# Patient Record
Sex: MALE | Race: WHITE | NOT HISPANIC OR LATINO | Employment: OTHER | URBAN - METROPOLITAN AREA
[De-identification: names, ages, dates, MRNs, and addresses within clinical notes are randomized per-mention and may not be internally consistent; named-entity substitution may affect disease eponyms.]

---

## 2018-06-07 ENCOUNTER — RECORDS - HEALTHEAST (OUTPATIENT)
Dept: LAB | Facility: HOSPITAL | Age: 68
End: 2018-06-07

## 2018-06-07 LAB
ANION GAP SERPL CALCULATED.3IONS-SCNC: 9 MMOL/L (ref 5–18)
CHLORIDE BLD-SCNC: 105 MMOL/L (ref 98–107)
CO2 SERPL-SCNC: 27 MMOL/L (ref 22–31)
LEVETIRACETAM (KEPPRA): 7.5 UG/ML (ref 6–46)
POTASSIUM BLD-SCNC: 4.7 MMOL/L (ref 3.5–5)
SODIUM SERPL-SCNC: 141 MMOL/L (ref 136–145)

## 2019-06-18 ENCOUNTER — RECORDS - HEALTHEAST (OUTPATIENT)
Dept: LAB | Facility: HOSPITAL | Age: 69
End: 2019-06-18

## 2019-06-18 LAB
ANION GAP SERPL CALCULATED.3IONS-SCNC: 10 MMOL/L (ref 5–18)
CHLORIDE BLD-SCNC: 105 MMOL/L (ref 98–107)
CO2 SERPL-SCNC: 25 MMOL/L (ref 22–31)
LEVETIRACETAM (KEPPRA): 7.6 UG/ML (ref 6–46)
POTASSIUM BLD-SCNC: 4 MMOL/L (ref 3.5–5)
SODIUM SERPL-SCNC: 140 MMOL/L (ref 136–145)

## 2020-09-30 DIAGNOSIS — G40.209 PARTIAL SYMPTOMATIC EPILEPSY WITH COMPLEX PARTIAL SEIZURES, NOT INTRACTABLE, WITHOUT STATUS EPILEPTICUS (H): Primary | ICD-10-CM

## 2020-09-30 RX ORDER — LEVETIRACETAM 500 MG/1
TABLET ORAL
Qty: 60 TABLET | Refills: 0 | Status: SHIPPED | OUTPATIENT
Start: 2020-09-30 | End: 2020-11-02

## 2020-11-02 DIAGNOSIS — G40.209 PARTIAL SYMPTOMATIC EPILEPSY WITH COMPLEX PARTIAL SEIZURES, NOT INTRACTABLE, WITHOUT STATUS EPILEPTICUS (H): ICD-10-CM

## 2020-11-02 NOTE — TELEPHONE ENCOUNTER
Refill request for Keppra   Letter mailed to patient to schedule follow up appt   Medication T'd for review and signature  Fany Hwang CMA on 11/2/2020 at 4:29 PM

## 2020-11-02 NOTE — LETTER
11/2/2020        RE: Vitor Vargas  [address] [address]            Dear Vitor,      We recently provided you with medication refills.  Many medications require routine follow-up with your doctor.    Your prescription(s) have been refilled for 30 days so you may have time for the above noted follow-up. Please call to schedule soon so we can assure you have an appointment before your next refills are needed. If you have already made a follow up appointment, please disregard this letter.           Sincerely,        Zechariah Mondragon MD  [facility]     (Formerly known as [facility])

## 2020-11-03 RX ORDER — LEVETIRACETAM 500 MG/1
500 TABLET ORAL 2 TIMES DAILY
Qty: 60 TABLET | Refills: 0 | Status: SHIPPED | OUTPATIENT
Start: 2020-11-03 | End: 2020-11-13

## 2020-11-12 SDOH — HEALTH STABILITY: MENTAL HEALTH: HOW MANY STANDARD DRINKS CONTAINING ALCOHOL DO YOU HAVE ON A TYPICAL DAY?: NOT ASKED

## 2020-11-12 SDOH — HEALTH STABILITY: MENTAL HEALTH: HOW OFTEN DO YOU HAVE 6 OR MORE DRINKS ON ONE OCCASION?: NOT ASKED

## 2020-11-12 SDOH — HEALTH STABILITY: MENTAL HEALTH: HOW OFTEN DO YOU HAVE A DRINK CONTAINING ALCOHOL?: NOT ASKED

## 2020-11-13 ENCOUNTER — VIRTUAL VISIT (OUTPATIENT)
Dept: NEUROLOGY | Facility: CLINIC | Age: 70
End: 2020-11-13
Payer: COMMERCIAL

## 2020-11-13 VITALS — HEIGHT: 68 IN | BODY MASS INDEX: 24.25 KG/M2 | WEIGHT: 160 LBS

## 2020-11-13 DIAGNOSIS — G40.209 PARTIAL SYMPTOMATIC EPILEPSY WITH COMPLEX PARTIAL SEIZURES, NOT INTRACTABLE, WITHOUT STATUS EPILEPTICUS (H): ICD-10-CM

## 2020-11-13 PROBLEM — E78.5 HYPERLIPIDEMIA: Status: ACTIVE | Noted: 2020-11-13

## 2020-11-13 PROCEDURE — 99214 OFFICE O/P EST MOD 30 MIN: CPT | Mod: 95 | Performed by: PSYCHIATRY & NEUROLOGY

## 2020-11-13 RX ORDER — LEVETIRACETAM 500 MG/1
500 TABLET ORAL 2 TIMES DAILY
Qty: 180 TABLET | Refills: 3 | Status: SHIPPED | OUTPATIENT
Start: 2020-11-13 | End: 2021-11-16

## 2020-11-13 ASSESSMENT — MIFFLIN-ST. JEOR: SCORE: 1460.26

## 2020-11-13 NOTE — LETTER
2020         RE: Vitor Vargas  [address]  [place] [address]        Dear Colleague,    Thank you for referring your patient, Vitor Vargas, to the [facility] [place]. Please see a copy of my visit note below.    NEUROLOGY FOLLOW UP VISIT  NOTE       [facility] [place]  [address] [place] [address]  Tel: [phone]  Fax: [phone]  [email or web address]     Vitor Vargas,  1950, MRN [id number]  PCP: No Ref-Primary, Physician, None  Date: 2020     ASSESSMENT & PLAN     Diagnosis code: Partial symptomatic epilepsy with complex partial seizures, not intractable, without status epilepticus (H)     Complex partial seizures  70-year-old male with complex partial seizures well-controlled on current dose of Keppra.  Overall he had 3 seizures when he was evaluated in the hospital.  Work-up in the past included MRI and EEG that were unremarkable.  I have refilled his prescription for Keppra.  I am checking Keppra level and electrolyte panel.  Regular follow-up will be in 1 year.    Thank you again for this referral, please feel free to contact me if you have any questions.    Zechariah Mondragon MD  [facility]  (Formerly, [facility]     HISTORY OF PRESENT ILLNESS     Patient is 70-year-old male with history of complex partial seizure, hyperlipidemia who returns for yearly follow-up.  Due to the pandemic this visit was done on the phone.  He denies any seizures since his last visit.  At baseline he is quite tangential.  Overall he had 3 seizures and in the past had MRI and EEG that was unremarkable.  He has been on Keppra and seems to be tolerating it well.  He denies any loss of consciousness, tonic-clonic activity, tongue biting or any bowel or bladder incontinence.     PROBLEM LIST   Patient Active Problem List   Diagnosis Code     Epilepsy with partial complex seizures  "(H) G40.209     Hyperlipidemia E78.5     Tobacco use disorder F17.200         PAST MEDICAL & SURGICAL HISTORY     Past Medical History:   Patient  has no past medical history on file.    Surgical History:  He  has no past surgical history on file.     SOCIAL HISTORY     Reviewed, and he  reports that he has been smoking cigarettes. He has been smoking about 0.50 packs per day. He has never used smokeless tobacco. He reports previous alcohol use.     FAMILY HISTORY     Reviewed, and family history includes Alzheimer Disease in his mother; Cerebrovascular Disease in his brother, father, and mother; Coronary Artery Disease in his father; Diabetes in his father; Myocardial Infarction in his brother and father.     ALLERGIES     No Known Allergies      REVIEW OF SYSTEMS     A 12 point review of system was performed and was negative except as outlined in the history of present illness.     HOME MEDICATIONS       Current Outpatient Medications:      levETIRAcetam (KEPPRA) 500 MG tablet, Take 1 tablet (500 mg) by mouth 2 times daily MUST SCHEDULE FOLLOW  UP APPT FOR REFILLS, Disp: 60 tablet, Rfl: 0      PHYSICAL EXAM     Vital signs  Ht 1.727 m (5' 8\")   Wt 72.6 kg (160 lb)   BMI 24.33 kg/m      Weight:   160 lbs 0 oz    Patient is alert and oriented speech normal with no dysarthria or aphasia.  Hearing is normal.  Rest of the exam was not possible on the phone visit     DIAGNOSTIC STUDIES     PERTINENT RADIOLOGY  Following imaging studies were reviewed:     MRI head 10/20/2015 showed age-related changes  EEG 10/8/2015 normal     PERTINENT LABS  Following labs were reviewed:  Result Name Current Result Reference Range   Levetiracetam (Keppra) Level (ug/mL)  7.6 6/18/2019 6.0 - 46.0   Sodium Level (mmol/L)  140 6/18/2019 136 - 145   Potassium Level (mmol/L)  4.0 6/18/2019 3.5 - 5.0   CO2 Level (mmol/L)  25 6/18/2019 22 - 31   Chloride Level (mmol/L)  105 6/18/2019 98 - 107   AGAP (mmol/L)  10 6/18/2019 5 - 18       " "  TELEPHONE VISIT CONSENT   This telephone visit was done in lieu of a face to face visit due to COVID 19 pandemic. The patient has been notified of following:    \"This telephone visit will be conducted via a call between you and your physician/provider. We have found that certain health care needs can be provided without the need for a physical exam. This service lets us provide the care you need with a short phone conversation. If a prescription is necessary we can send it directly to your pharmacy. If lab work is needed we can place an order for that and you can then stop by our lab to have the test done at a later time. If during the course of the call the physician/provider feels a telephone visit is not appropriate, you will not be charged for this service.\"    Patient has given verbal consent for Telephone visit? YES  Consent has been obtained for this service by 1 care team member: YES    Total Time: visit 30 minutes         Total time spent for face to face visit, reviewing labs/imaging studies, counseling and coordination of care was: 30 Minutes More than 50% of this time was spent on counseling and coordination of care.      This note was dictated using voice recognition software.  Any grammatical or context distortions are unintentional and inherent to the software.             Again, thank you for allowing me to participate in the care of your patient.        Sincerely,        Zechariah Mondragon MD    "

## 2020-11-13 NOTE — PROGRESS NOTES
NEUROLOGY FOLLOW UP VISIT  NOTE       [facility]UR[facility] [place]  [address] [place] [address]  Tel: [phone]  Fax: [phone]  [email or web address]     Vitor Vargas,  1950, MRN [id number]  PCP: No Ref-Primary, Physician, None  Date: 2020     ASSESSMENT & PLAN     Diagnosis code: Partial symptomatic epilepsy with complex partial seizures, not intractable, without status epilepticus (H)     Complex partial seizures  70-year-old male with complex partial seizures well-controlled on current dose of Keppra.  Overall he had 3 seizures when he was evaluated in the hospital.  Work-up in the past included MRI and EEG that were unremarkable.  I have refilled his prescription for Keppra.  I am checking Keppra level and electrolyte panel.  Regular follow-up will be in 1 year.    Thank you again for this referral, please feel free to contact me if you have any questions.    Zechariah Mondragon MD  [facility]  (Former[facility], [facility]     HISTORY OF PRESENT ILLNESS     Patient is 70-year-old male with history of complex partial seizure, hyperlipidemia who returns for yearly follow-up.  Due to the pandemic this visit was done on the phone.  He denies any seizures since his last visit.  At baseline he is quite tangential.  Overall he had 3 seizures and in the past had MRI and EEG that was unremarkable.  He has been on Keppra and seems to be tolerating it well.  He denies any loss of consciousness, tonic-clonic activity, tongue biting or any bowel or bladder incontinence.     PROBLEM LIST   Patient Active Problem List   Diagnosis Code     Epilepsy with partial complex seizures (H) G40.209     Hyperlipidemia E78.5     Tobacco use disorder F17.200         PAST MEDICAL & SURGICAL HISTORY     Past Medical History:   Patient  has no past medical history on file.    Surgical History:  He  has no past surgical history on file.  "    SOCIAL HISTORY     Reviewed, and he  reports that he has been smoking cigarettes. He has been smoking about 0.50 packs per day. He has never used smokeless tobacco. He reports previous alcohol use.     FAMILY HISTORY     Reviewed, and family history includes Alzheimer Disease in his mother; Cerebrovascular Disease in his brother, father, and mother; Coronary Artery Disease in his father; Diabetes in his father; Myocardial Infarction in his brother and father.     ALLERGIES     No Known Allergies      REVIEW OF SYSTEMS     A 12 point review of system was performed and was negative except as outlined in the history of present illness.     HOME MEDICATIONS       Current Outpatient Medications:      levETIRAcetam (KEPPRA) 500 MG tablet, Take 1 tablet (500 mg) by mouth 2 times daily MUST SCHEDULE FOLLOW  UP APPT FOR REFILLS, Disp: 60 tablet, Rfl: 0      PHYSICAL EXAM     Vital signs  Ht 1.727 m (5' 8\")   Wt 72.6 kg (160 lb)   BMI 24.33 kg/m      Weight:   160 lbs 0 oz    Patient is alert and oriented speech normal with no dysarthria or aphasia.  Hearing is normal.  Rest of the exam was not possible on the phone visit     DIAGNOSTIC STUDIES     PERTINENT RADIOLOGY  Following imaging studies were reviewed:     MRI head 10/20/2015 showed age-related changes  EEG 10/8/2015 normal     PERTINENT LABS  Following labs were reviewed:  Result Name Current Result Reference Range   Levetiracetam (Keppra) Level (ug/mL)  7.6 6/18/2019 6.0 - 46.0   Sodium Level (mmol/L)  140 6/18/2019 136 - 145   Potassium Level (mmol/L)  4.0 6/18/2019 3.5 - 5.0   CO2 Level (mmol/L)  25 6/18/2019 22 - 31   Chloride Level (mmol/L)  105 6/18/2019 98 - 107   AGAP (mmol/L)  10 6/18/2019 5 - 18         TELEPHONE VISIT CONSENT   This telephone visit was done in lieu of a face to face visit due to COVID 19 pandemic. The patient has been notified of following:    \"This telephone visit will be conducted via a call between you and your physician/provider. " "We have found that certain health care needs can be provided without the need for a physical exam. This service lets us provide the care you need with a short phone conversation. If a prescription is necessary we can send it directly to your pharmacy. If lab work is needed we can place an order for that and you can then stop by our lab to have the test done at a later time. If during the course of the call the physician/provider feels a telephone visit is not appropriate, you will not be charged for this service.\"    Patient has given verbal consent for Telephone visit? YES  Consent has been obtained for this service by 1 care team member: YES    Total Time: visit 30 minutes         Total time spent for face to face visit, reviewing labs/imaging studies, counseling and coordination of care was: 30 Minutes More than 50% of this time was spent on counseling and coordination of care.      This note was dictated using voice recognition software.  Any grammatical or context distortions are unintentional and inherent to the software.         "

## 2020-11-25 ENCOUNTER — RECORDS - HEALTHEAST (OUTPATIENT)
Dept: LAB | Facility: HOSPITAL | Age: 70
End: 2020-11-25

## 2020-11-25 LAB
ANION GAP SERPL CALCULATED.3IONS-SCNC: 7 MMOL/L (ref 5–18)
CHLORIDE BLD-SCNC: 106 MMOL/L (ref 98–107)
CO2 SERPL-SCNC: 28 MMOL/L (ref 22–31)
LEVETIRACETAM (KEPPRA): 17.9 UG/ML (ref 6–46)
POTASSIUM BLD-SCNC: 4.3 MMOL/L (ref 3.5–5)
SODIUM SERPL-SCNC: 141 MMOL/L (ref 136–145)

## 2021-05-26 ENCOUNTER — RECORDS - HEALTHEAST (OUTPATIENT)
Dept: ADMINISTRATIVE | Facility: CLINIC | Age: 71
End: 2021-05-26

## 2021-11-14 DIAGNOSIS — G40.209 PARTIAL SYMPTOMATIC EPILEPSY WITH COMPLEX PARTIAL SEIZURES, NOT INTRACTABLE, WITHOUT STATUS EPILEPTICUS (H): ICD-10-CM

## 2021-11-14 NOTE — LETTER
11/14/2021        RE: Vitor Vargas  [address] [address]          Dear Vitor,      We recently provided you with medication refills.  Many medications require routine follow-up with your doctor.    Your prescription(s) have been refilled for 30 days so you may have time for the above noted follow-up. Please call to schedule soon so we can assure you have an appointment before your next refills are needed. If you have already made a follow up appointment, please disregard this letter.           Sincerely,        Zechariah Mondragon MD  [facility]     (Formerly known as [facility])

## 2021-11-16 RX ORDER — LEVETIRACETAM 500 MG/1
500 TABLET ORAL 2 TIMES DAILY
Qty: 60 TABLET | Refills: 0 | Status: SHIPPED | OUTPATIENT
Start: 2021-11-16 | End: 2021-12-13

## 2021-11-16 NOTE — TELEPHONE ENCOUNTER
Refill request for Keppra  Letter mailed to pt to schedule follow up appt  Medication T'd for review and signature  Fany Hwang CMA on 11/16/2021 at 4:28 AM

## 2021-12-13 DIAGNOSIS — G40.209 PARTIAL SYMPTOMATIC EPILEPSY WITH COMPLEX PARTIAL SEIZURES, NOT INTRACTABLE, WITHOUT STATUS EPILEPTICUS (H): ICD-10-CM

## 2021-12-13 RX ORDER — LEVETIRACETAM 500 MG/1
TABLET ORAL
Qty: 60 TABLET | Refills: 0 | Status: SHIPPED | OUTPATIENT
Start: 2021-12-13 | End: 2022-01-17

## 2021-12-13 NOTE — TELEPHONE ENCOUNTER
Medication refill request for levETIRAcetam (KEPPRA) 500 MG tablet.    Patient was last seen on November 13, 2020.    Letter was sent on 11/14/2021 to remind pt to call to follow up appt. No appt made yet.    Medication T'd for review and signature    JULIANNA Mariscal on 12/13/2021 at 11:38 AM

## 2022-01-17 DIAGNOSIS — G40.209 PARTIAL SYMPTOMATIC EPILEPSY WITH COMPLEX PARTIAL SEIZURES, NOT INTRACTABLE, WITHOUT STATUS EPILEPTICUS (H): ICD-10-CM

## 2022-01-17 RX ORDER — LEVETIRACETAM 500 MG/1
500 TABLET ORAL 2 TIMES DAILY
Qty: 28 TABLET | Refills: 0 | Status: SHIPPED | OUTPATIENT
Start: 2022-01-17 | End: 2022-01-31

## 2022-01-17 NOTE — TELEPHONE ENCOUNTER
Refill request for Keppra. Pt last seen 11/13/20 and was due for a follow up around 11/2021. Pt has not been seen and does not have follow up scheduled. Pt has been mailed letter regarding this need. Will send 14 day supply with zero refills.     Alison More RN on 1/17/2022 at 10:06 AM

## 2022-01-31 DIAGNOSIS — G40.209 PARTIAL SYMPTOMATIC EPILEPSY WITH COMPLEX PARTIAL SEIZURES, NOT INTRACTABLE, WITHOUT STATUS EPILEPTICUS (H): ICD-10-CM

## 2022-01-31 RX ORDER — LEVETIRACETAM 500 MG/1
500 TABLET ORAL 2 TIMES DAILY
Qty: 14 TABLET | Refills: 0 | Status: SHIPPED | OUTPATIENT
Start: 2022-01-31 | End: 2022-02-07

## 2022-01-31 NOTE — TELEPHONE ENCOUNTER
Refill request for Keppra. Pt last seen 11/13/20 and has been sent multiple requests for follow up. Will send 7 day supply with zero refills.     Alison More RN on 1/31/2022 at 3:54 PM

## 2022-02-07 DIAGNOSIS — G40.209 PARTIAL SYMPTOMATIC EPILEPSY WITH COMPLEX PARTIAL SEIZURES, NOT INTRACTABLE, WITHOUT STATUS EPILEPTICUS (H): ICD-10-CM

## 2022-02-07 RX ORDER — LEVETIRACETAM 500 MG/1
500 TABLET ORAL 2 TIMES DAILY
Qty: 14 TABLET | Refills: 0 | Status: SHIPPED | OUTPATIENT
Start: 2022-02-07 | End: 2022-02-14

## 2022-02-07 NOTE — TELEPHONE ENCOUNTER
Refill request for Keppra. Pt last seen 11/13/20 and is currently overdue for follow up. Pt has been notified of this need. Will send in 7 day supply with zero refills.     Alison More RN on 2/7/2022 at 11:25 AM

## 2022-02-14 DIAGNOSIS — G40.209 PARTIAL SYMPTOMATIC EPILEPSY WITH COMPLEX PARTIAL SEIZURES, NOT INTRACTABLE, WITHOUT STATUS EPILEPTICUS (H): ICD-10-CM

## 2022-02-14 RX ORDER — LEVETIRACETAM 500 MG/1
500 TABLET ORAL 2 TIMES DAILY
Qty: 30 TABLET | Refills: 1 | Status: SHIPPED | OUTPATIENT
Start: 2022-02-14 | End: 2022-02-25

## 2022-02-14 NOTE — TELEPHONE ENCOUNTER
Refill request for Keppra. Pt last seen 11/13/20 and has follow up scheduled for 2/25/22. Will send in refill to get pt through to this appt.     Alison More RN on 2/14/2022 at 12:27 PM

## 2022-02-25 ENCOUNTER — LAB (OUTPATIENT)
Dept: LAB | Facility: HOSPITAL | Age: 72
End: 2022-02-25
Payer: COMMERCIAL

## 2022-02-25 ENCOUNTER — OFFICE VISIT (OUTPATIENT)
Dept: NEUROLOGY | Facility: CLINIC | Age: 72
End: 2022-02-25
Payer: COMMERCIAL

## 2022-02-25 VITALS
HEART RATE: 88 BPM | WEIGHT: 158 LBS | SYSTOLIC BLOOD PRESSURE: 156 MMHG | DIASTOLIC BLOOD PRESSURE: 91 MMHG | HEIGHT: 68 IN | BODY MASS INDEX: 23.95 KG/M2

## 2022-02-25 DIAGNOSIS — G40.209 PARTIAL SYMPTOMATIC EPILEPSY WITH COMPLEX PARTIAL SEIZURES, NOT INTRACTABLE, WITHOUT STATUS EPILEPTICUS (H): Primary | ICD-10-CM

## 2022-02-25 DIAGNOSIS — G40.209 PARTIAL SYMPTOMATIC EPILEPSY WITH COMPLEX PARTIAL SEIZURES, NOT INTRACTABLE, WITHOUT STATUS EPILEPTICUS (H): ICD-10-CM

## 2022-02-25 LAB
ANION GAP SERPL CALCULATED.3IONS-SCNC: 7 MMOL/L (ref 5–18)
CHLORIDE BLD-SCNC: 106 MMOL/L (ref 98–107)
CO2 SERPL-SCNC: 26 MMOL/L (ref 22–31)
LEVETIRACETAM (KEPPRA): 20.3 UG/ML (ref 6–46)
POTASSIUM BLD-SCNC: 4.3 MMOL/L (ref 3.5–5)
SODIUM SERPL-SCNC: 139 MMOL/L (ref 136–145)

## 2022-02-25 PROCEDURE — 99214 OFFICE O/P EST MOD 30 MIN: CPT | Performed by: PSYCHIATRY & NEUROLOGY

## 2022-02-25 PROCEDURE — 80177 DRUG SCRN QUAN LEVETIRACETAM: CPT

## 2022-02-25 PROCEDURE — 80051 ELECTROLYTE PANEL: CPT

## 2022-02-25 PROCEDURE — 36415 COLL VENOUS BLD VENIPUNCTURE: CPT

## 2022-02-25 RX ORDER — LEVETIRACETAM 500 MG/1
500 TABLET ORAL 2 TIMES DAILY
Qty: 180 TABLET | Refills: 3 | Status: SHIPPED | OUTPATIENT
Start: 2022-02-25 | End: 2022-02-25

## 2022-02-25 RX ORDER — LEVETIRACETAM 500 MG/1
500 TABLET ORAL 2 TIMES DAILY
Qty: 60 TABLET | Refills: 11 | Status: SHIPPED | OUTPATIENT
Start: 2022-02-25 | End: 2023-02-27

## 2022-02-25 NOTE — PROGRESS NOTES
NEUROLOGY FOLLOW UP VISIT  NOTE       [facility] NEUROLOG[facility] [place]  [address] [place] [address]  Tel: [phone]  Fax: [phone]  [email or web address]     Vitor Vargas,  1950, MRN [id number]  PCP: No Ref-Primary, Physician  Date: 2022      ASSESSMENT & PLAN     Visit Diagnosis  1. Partial symptomatic epilepsy with complex partial seizures, not intractable, without status epilepticus (H)     Complex partial seizure  71-year-old male with history of complex partial seizure, hyperlipidemia returns for yearly follow-up.  His seizures are well controlled on current dose of Keppra.  Previous work-up included normal MRI brain and EEG.  I have recommended:    1.  Continue Keppra 500 mg twice daily.  Prescriptions were filled for next year.  2.  Check Keppra level and electrolyte panel  3.  His last EEG was in  and I have recommended repeating a sleep deprived EEG  4.  Follow-up will be in 1 year    Thank you again for this referral, please feel free to contact me if you have any questions.    Zechariah Mondragon MD  [facility]  (Formerly, [facility]     HISTORY OF PRESENT ILLNESS     Patient is a 71-year-old male with history of complex partial seizure, HLD and cigarette smoking who returns for yearly follow-up.  He denies any seizures since his last visit.  There is no history of any loss of consciousness, tonic-clonic activity or any tongue biting.  He is quite tangential.  Overall he had 3 seizures in the past and work-up included MRI and EEG that were unremarkable.  He was started on Keppra and seems to be tolerating it well.  He has no specific complaints and wants his medication refilled    He was initially seen in our clinic in  when he reported that although he had history of seizure disorder he stopped taking seizure medication because he was told by someone.  In 2015 he wandered into someone  "else's house after he was mowing his lawn and had no recollection of how he walked from his home to his neighbor's house.  Paramedics were called he was taken to the emergency room.  Prior to that in 2014 he was admitted to the hospital with possible seizures and although he was started on Keppra for unclear reason he was not taking that medication and it was restarted.     PROBLEM LIST   Patient Active Problem List   Diagnosis Code     Epilepsy with partial complex seizures (H) G40.209     Hyperlipidemia E78.5     Tobacco use disorder F17.200         PAST MEDICAL & SURGICAL HISTORY     Past Medical History:   Patient  has no past medical history on file.    Surgical History:  He  has no past surgical history on file.     SOCIAL HISTORY     Reviewed, and he  reports that he has been smoking cigarettes. He has been smoking about 0.25 packs per day. He has never used smokeless tobacco. He reports previous alcohol use.     FAMILY HISTORY     Reviewed, and family history includes Alzheimer Disease in his mother; Cerebrovascular Disease in his brother, father, and mother; Coronary Artery Disease in his father; Diabetes in his father; Myocardial Infarction in his brother and father; No Known Problems in his brother.     ALLERGIES     No Known Allergies      REVIEW OF SYSTEMS     A 12 point review of system was performed and was negative except as outlined in the history of present illness.     HOME MEDICATIONS     Current Outpatient Rx   Medication Sig Dispense Refill     levETIRAcetam (KEPPRA) 500 MG tablet Take 1 tablet (500 mg) by mouth 2 times daily 60 tablet 11         PHYSICAL EXAM     Vital signs  BP (!) 156/91 (BP Location: Right arm, Patient Position: Sitting)   Pulse 88   Ht 1.727 m (5' 8\")   Wt 71.7 kg (158 lb)   BMI 24.02 kg/m      Weight:   158 lbs 0 oz    Patient is alert and oriented x4 in no acute distress. Vital signs were reviewed and are documented in electronic medical record. Neck was supple, no " carotid bruits, thyromegaly, JVD, or lymphadenopathy was noted.   NEUROLOGY EXAM:    Patient s speech was normal with no aphasia or dysarthria. Mentation, and affect were also normal.     Funduscopic exam was normal, with normal cup to disc ratio. Cranial nerves II -XII were intact.     Patient had normal mass, tone and motor strength was 5/5 in all extremities without pronator drift.     Sensation was intact to light touch, pinprick, and vibratory sensation.     Reflexes were 1+ symmetrical with downgoing toes.     Minimal dysmetria on finger-nose testing. Romberg was negative.    Gait testing was normal.     PERTINENT DIAGNOSTIC STUDIES     Following studies were reviewed:     MRI BRAIN 10/20/2015   1.  No mass, hemorrhage, or recent infarct. No definite seizure generating focus identified.  2.  Mild inflammatory changes of the paranasal sinuses.  3.  No significant interval change compared to 08/06/2014.    EEG 10/8/2015   This is essentially normal awake EEG.  The recording is contaminated with muscle artifact that makes interpreting this tracing somewhat challenging.       PERTINENT LABS  Following labs were reviewed:  No visits with results within 3 Month(s) from this visit.   Latest known visit with results is:   Records - [facility] on 11/25/2020   Component Date Value     Levetiracetam 11/25/2020 17.9      Sodium 11/25/2020 141      Potassium 11/25/2020 4.3      Carbon Dioxide (CO2) 11/25/2020 28      Chloride 11/25/2020 106      Anion Gap 11/25/2020 7          Total time spent for face to face visit, reviewing labs/imaging studies, counseling and coordination of care was: 30 Minutes spent on the date of the encounter doing chart review, review of outside records, review of test results, interpretation of tests, patient visit and documentation       This note was dictated using voice recognition software.  Any grammatical or context distortions are unintentional and inherent to the software.    Orders  Placed This Encounter   Procedures     Electrolyte panel     Keppra (Levetiracetam) Level     EEG Sleep Deprived      New Prescriptions    No medications on file     Modified Medications    Modified Medication Previous Medication    LEVETIRACETAM (KEPPRA) 500 MG TABLET levETIRAcetam (KEPPRA) 500 MG tablet       Take 1 tablet (500 mg) by mouth 2 times daily    Take 1 tablet (500 mg) by mouth 2 times daily

## 2022-02-25 NOTE — PATIENT INSTRUCTIONS
Patient Education     Discharge Instructions for Epilepsy  You have been diagnosed with epilepsy, a disorder of recurring seizures. When you have a seizure, an electrical disturbance happens in your brain. There are different kinds of seizures, and each person may have one or many types of seizures. Here are some guidelines for you and your family.  If you have a seizure  Ask friends and family members to learn seizure management. Also, tell them to do the following if you have a seizure:    Clear the area to prevent injury.    Position you on a flat, carpeted surface, if possible.    Don t try to restrain you.    Don t put anything in your mouth.    Turn you onto your side if you start to vomit.    Keep track of the date and time the seizure started, how long it lasted, whether or not you lost consciousness, a description of your body movements, what provoked the seizure (if known), and any injuries you suffered. Using a watch may help keep correct time of events.      Stay with you until you regain consciousness.    Call 911 if the seizure is longer than 5 minutes, if there are multiple seizures, or if you don't start to wake up after the seizure stops.    Activities  Following are some things to consider:    Enjoy your normal activities. Most people with epilepsy lead normal lives.    Don't do hazardous activities, such as mountain climbing or scuba diving. A seizure under these conditions could lead to a fatal accident.    Don't swim alone or participate in other similar activities without others nearby.    Ask your healthcare provider about any restrictions on driving or other activities.    Check with your state department of public safety to learn whether there are any driving limitations based on your condition.  Other home care  Other considerations:    Take your medicine exactly as directed. Skipping doses can affect the way your body handles the medicine, which could cause you to have a  seizure.    Don t drink alcohol or use any medicine without talking with your healthcare provider first.    Seizure medicines may interact with other medicines. Make sure all of your healthcare providers have a list of all your medicines.     Birth control pills may not work as effectively when taking seizure medicines. Ask your healthcare provider if a change in birth control is needed.     Wear a medical alert pendant or bracelet that alerts others to your condition, especially if you are allergic to seizure medicine.    Join a local support group. Ask your healthcare provider for names and phone numbers.  Call 911  Tell your family members or friends to call 911 right away if you have:    Seizure that lasts more than 5 minutes    Multiple seizures in a row    No recovery of consciousness after the seizure stops  When to call your healthcare provider  Have family members or friends call your healthcare provider right away if you have:    Seizures that are getting longer and worse    Seizures that are different from those you ve had in the past    Seizures strong enough to cause injury    Skin rash    Fever of 100.4 F (38 C) or higher, or as directed by your healthcare provider  Agustín last reviewed this educational content on 4/1/2018 2000-2021 [employer]. All rights reserved. This information is not intended as a substitute for professional medical care. Always follow your healthcare professional's instructions.

## 2022-02-25 NOTE — NURSING NOTE
Chief Complaint   Patient presents with     Seizures     Annual follow up- doing well      Fany Hwang CMA on 2/25/2022 at 9:12 AM

## 2022-02-28 ENCOUNTER — ANCILLARY PROCEDURE (OUTPATIENT)
Dept: NEUROLOGY | Facility: CLINIC | Age: 72
End: 2022-02-28
Attending: PSYCHIATRY & NEUROLOGY
Payer: COMMERCIAL

## 2022-02-28 DIAGNOSIS — G40.209 PARTIAL SYMPTOMATIC EPILEPSY WITH COMPLEX PARTIAL SEIZURES, NOT INTRACTABLE, WITHOUT STATUS EPILEPTICUS (H): ICD-10-CM

## 2022-02-28 PROCEDURE — 95812 EEG 41-60 MINUTES: CPT | Performed by: PSYCHIATRY & NEUROLOGY

## 2023-02-25 DIAGNOSIS — G40.209 PARTIAL SYMPTOMATIC EPILEPSY WITH COMPLEX PARTIAL SEIZURES, NOT INTRACTABLE, WITHOUT STATUS EPILEPTICUS (H): ICD-10-CM

## 2023-02-25 NOTE — LETTER
2/25/2023        RE: Vitor Vargas  [address] [facility] [address]          Dear Vitor,      We recently provided you with medication refills.  Many medications require routine follow-up with your doctor.    Your prescription(s) have been refilled for 30 days so you may have time for the above noted follow-up. Please call to schedule soon so we can assure you have an appointment before your next refills are needed. If you have already made a follow up appointment, please disregard this letter.           Sincerely,        Zechariah Mondragon MD  [facility]     (Formerly known as [facility])

## 2023-02-27 RX ORDER — LEVETIRACETAM 500 MG/1
500 TABLET ORAL 2 TIMES DAILY
Qty: 60 TABLET | Refills: 0 | Status: SHIPPED | OUTPATIENT
Start: 2023-02-27 | End: 2023-03-27

## 2023-02-27 NOTE — TELEPHONE ENCOUNTER
Refill request for: levETIRAcetam (KEPPRA) 500 MG tablet  Directions: Take 1 tablet (500 mg) by mouth 2 times daily    LOV: 2/25/22  NOV: Not scheduled -Letter mailed to patient to schedule follow up    30 day supply with 0 refills Medication T'd for review and signature

## 2023-03-27 DIAGNOSIS — G40.209 PARTIAL SYMPTOMATIC EPILEPSY WITH COMPLEX PARTIAL SEIZURES, NOT INTRACTABLE, WITHOUT STATUS EPILEPTICUS (H): ICD-10-CM

## 2023-03-27 RX ORDER — LEVETIRACETAM 500 MG/1
500 TABLET ORAL 2 TIMES DAILY
Qty: 180 TABLET | Refills: 1 | Status: SHIPPED | OUTPATIENT
Start: 2023-03-27 | End: 2023-08-29

## 2023-03-27 NOTE — TELEPHONE ENCOUNTER
Refill request for: levETIRAcetam (KEPPRA) 500 MG tablet  Directions: Take 1 tablet (500 mg) by mouth 2 times daily    LOV: 2/25/22  NOV: 8/29/23    90 day supply with 1 refills Medication T'd for review and signature  Fany Hwang CMA on 3/27/2023 at 11:54 AM

## 2023-08-27 NOTE — PROGRESS NOTES
NEUROLOGY FOLLOW UP VISIT  NOTE       [facility] NEUROLOGY [place]  [address] [place] [address]  Tel: [phone]  Fax: [phone]  [email or web address]     Vitor Vargas,  1950, MRN [id number]  PCP: No Ref-Primary, Physician  Date: 2023      ASSESSMENT & PLAN     Visit Diagnosis  Partial symptomatic epilepsy with complex partial seizures, not intractable, without status epilepticus (H)     Complex partial seizures  72-year-old male with history of complex partial seizure, HLD who returns for yearly follow-up.  He denies any seizures since his last visit.  I have recommended:    1.  Continue Keppra 500 mg twice daily.  Prescriptions refilled  2.  Check Keppra level and basic metabolic panel  3.  He does not follow-up with a primary care physician and it has been a long time since he saw primary care physician and I have encouraged him to establish care with a clinic but is not interested  4.  Follow-up in 1 year    Thank you again for this referral, please feel free to contact me if you have any questions.    Zechariah Mondragon MD  [facility]  (Former[facility], [facility]     HISTORY OF PRESENT ILLNESS     Patient is a 72-year-old male with history of complex partial seizure, HLD who returns for yearly follow-up.  His seizures are well controlled on Keppra.  He denies any seizures since her last visit.  As usual he is quite tangential.  Overall he had total 3 seizures in the past and work-up in the past including MRI and EEG that were unremarkable.  He was started on Keppra and had since remained seizure-free.    He was initially seen in our clinic in  when he reported that although he had history of seizure disorder he stopped taking seizure medication because he was told by someone.  In 2015 he wandered into someone else's house after he was mowing his lawn and had no recollection of how he walked from his  "home to his neighbor's house.  Paramedics were called he was taken to the emergency room.  Prior to that in 2014 he was admitted to the hospital with possible seizures and although he was started on Keppra for unclear reason he was not taking that medication and it was restarted.     PROBLEM LIST   Patient Active Problem List   Diagnosis Code    Epilepsy with partial complex seizures (H) G40.209    Hyperlipidemia E78.5    Tobacco use disorder F17.200         PAST MEDICAL & SURGICAL HISTORY     Past Medical History:   Patient  has no past medical history on file.    Surgical History:  He  has no past surgical history on file.     SOCIAL HISTORY     Reviewed, and he  reports that he has been smoking cigarettes. He has been smoking an average of .25 packs per day. He has never used smokeless tobacco. He reports that he does not currently use alcohol.     FAMILY HISTORY     Reviewed, and family history includes Alzheimer Disease in his mother; Cerebrovascular Disease in his brother, father, and mother; Coronary Artery Disease in his father; Diabetes in his father; Myocardial Infarction in his brother and father; No Known Problems in his brother.     ALLERGIES     No Known Allergies      REVIEW OF SYSTEMS     A 12 point review of system was performed and was negative except as outlined in the history of present illness.     HOME MEDICATIONS     Current Outpatient Rx   Medication Sig Dispense Refill    levETIRAcetam (KEPPRA) 500 MG tablet Take 1 tablet (500 mg) by mouth 2 times daily 180 tablet 3         PHYSICAL EXAM     Vital signs  /81 (BP Location: Right arm, Patient Position: Sitting)   Pulse 70   Ht 1.727 m (5' 8\")   Wt 74.8 kg (165 lb)   BMI 25.09 kg/m      Weight:   165 lbs 0 oz    Patient is alert and oriented x4 in no acute distress. Vital signs were reviewed and are documented in electronic medical record. Neck was supple, no carotid bruits, thyromegaly, JVD, or lymphadenopathy was noted.   NEUROLOGY " EXAM:   Patient s speech was normal with no aphasia or dysarthria. Mentation, and affect were also normal.    Funduscopic exam was normal, with normal cup to disc ratio. Cranial nerves II -XII were intact.    Patient had normal mass, tone and motor strength was 5/5 in all extremities without pronator drift.    Sensation was intact to light touch, pinprick, and vibratory sensation.    Reflexes were 1+ symmetrical with downgoing toes.    No dysmetria noted on FNF or HKS. Romberg was negative.   Gait testing was normal. Able to walk on toes/heels. Tandem walk normal.     PERTINENT DIAGNOSTIC STUDIES     Following studies were reviewed:     MRI BRAIN 10/20/2015   1.  No mass, hemorrhage, or recent infarct. No definite seizure generating focus identified.  2.  Mild inflammatory changes of the paranasal sinuses.  3.  No significant interval change compared to 08/06/2014.    EEG 10/8/2015   This is essentially normal awake EEG.  The recording is contaminated with muscle artifact that makes interpreting this tracing somewhat challenging.     PERTINENT LABS  Following labs were reviewed:  No visits with results within 3 Month(s) from this visit.   Latest known visit with results is:   Lab on 02/25/2022   Component Date Value Ref Range Status    Sodium 02/25/2022 139  136 - 145 mmol/L Final    Potassium 02/25/2022 4.3  3.5 - 5.0 mmol/L Final    Chloride 02/25/2022 106  98 - 107 mmol/L Final    Carbon Dioxide (CO2) 02/25/2022 26  22 - 31 mmol/L Final    Anion Gap 02/25/2022 7  5 - 18 mmol/L Final    Levetiracetam 02/25/2022 20.3  6.0 - 46.0 ug/mL Final         Total time spent for face to face visit, reviewing labs/imaging studies, counseling and coordination of care was: 30 Minutes spent on the date of the encounter doing chart review, review of outside records, review of test results, interpretation of tests, patient visit, and documentation     This note was dictated using voice recognition software.  Any grammatical or  context distortions are unintentional and inherent to the software.    Orders Placed This Encounter   Procedures    Keppra (Levetiracetam) Level    Basic metabolic panel      New Prescriptions    No medications on file     Modified Medications    Modified Medication Previous Medication    LEVETIRACETAM (KEPPRA) 500 MG TABLET levETIRAcetam (KEPPRA) 500 MG tablet       Take 1 tablet (500 mg) by mouth 2 times daily    Take 1 tablet (500 mg) by mouth 2 times daily for 180 days

## 2023-08-29 ENCOUNTER — LAB (OUTPATIENT)
Dept: LAB | Facility: HOSPITAL | Age: 73
End: 2023-08-29
Payer: COMMERCIAL

## 2023-08-29 ENCOUNTER — OFFICE VISIT (OUTPATIENT)
Dept: NEUROLOGY | Facility: CLINIC | Age: 73
End: 2023-08-29
Payer: COMMERCIAL

## 2023-08-29 VITALS
HEART RATE: 70 BPM | DIASTOLIC BLOOD PRESSURE: 81 MMHG | WEIGHT: 165 LBS | BODY MASS INDEX: 25.01 KG/M2 | HEIGHT: 68 IN | SYSTOLIC BLOOD PRESSURE: 138 MMHG

## 2023-08-29 DIAGNOSIS — G40.209 PARTIAL SYMPTOMATIC EPILEPSY WITH COMPLEX PARTIAL SEIZURES, NOT INTRACTABLE, WITHOUT STATUS EPILEPTICUS (H): Primary | ICD-10-CM

## 2023-08-29 DIAGNOSIS — G40.209 PARTIAL SYMPTOMATIC EPILEPSY WITH COMPLEX PARTIAL SEIZURES, NOT INTRACTABLE, WITHOUT STATUS EPILEPTICUS (H): ICD-10-CM

## 2023-08-29 LAB
ANION GAP SERPL CALCULATED.3IONS-SCNC: 8 MMOL/L (ref 7–15)
BUN SERPL-MCNC: 9.9 MG/DL (ref 8–23)
CALCIUM SERPL-MCNC: 9.2 MG/DL (ref 8.8–10.2)
CHLORIDE SERPL-SCNC: 103 MMOL/L (ref 98–107)
CREAT SERPL-MCNC: 0.88 MG/DL (ref 0.67–1.17)
DEPRECATED HCO3 PLAS-SCNC: 26 MMOL/L (ref 22–29)
GFR SERPL CREATININE-BSD FRML MDRD: >90 ML/MIN/1.73M2
GLUCOSE SERPL-MCNC: 79 MG/DL (ref 70–99)
LEVETIRACETAM SERPL-MCNC: 12.6 ΜG/ML (ref 10–40)
POTASSIUM SERPL-SCNC: 4.1 MMOL/L (ref 3.4–5.3)
SODIUM SERPL-SCNC: 137 MMOL/L (ref 136–145)

## 2023-08-29 PROCEDURE — 99214 OFFICE O/P EST MOD 30 MIN: CPT | Performed by: PSYCHIATRY & NEUROLOGY

## 2023-08-29 PROCEDURE — 36415 COLL VENOUS BLD VENIPUNCTURE: CPT

## 2023-08-29 PROCEDURE — 80177 DRUG SCRN QUAN LEVETIRACETAM: CPT

## 2023-08-29 PROCEDURE — 82310 ASSAY OF CALCIUM: CPT

## 2023-08-29 RX ORDER — LEVETIRACETAM 500 MG/1
500 TABLET ORAL 2 TIMES DAILY
Qty: 180 TABLET | Refills: 3 | Status: SHIPPED | OUTPATIENT
Start: 2023-08-29 | End: 2024-08-29

## 2023-08-29 NOTE — NURSING NOTE
Chief Complaint   Patient presents with    Complex partial seizure     Annual follow up- patient reports no seizure activity      Fany Hwang CMA on 8/29/2023 at 2:14 PM

## 2023-08-29 NOTE — Clinical Note
2023         RE: Vitor Vargas  [address]  [facility] [address]        Dear Colleague,    Thank you for referring your patient, Vitor Vargas, to the [facility] [place]. Please see a copy of my visit note below.    NEUROLOGY FOLLOW UP VISIT  NOTE       [facility] NEUROLOG[facility] [place]  [address] [place] [address]  Tel: [phone]  Fax: [phone]  [email or web address]     Vitor Vargas,  1950, MRN [id number]  PCP: No Ref-Primary, Physician  Date: 2023      ASSESSMENT & PLAN     Visit Diagnosis  Partial symptomatic epilepsy with complex partial seizures, not intractable, without status epilepticus (H)     ***    Thank you again for this referral, please feel free to contact me if you have any questions.    Zechariah Mondragon MD  [facility]  (Former[facility], [facility]     HISTORY OF PRESENT ILLNESS     Patient is a 72-year-old male with history of complex partial seizure, HLD who returns for yearly follow-up.  His seizures are well controlled on Keppra.  He denies any seizures since her last visit.  As usual he is quite tangential.  Overall he had total 3 seizures in the past and work-up in the past including MRI and EEG that were unremarkable.  He was started on Keppra and had since remained seizure-free.    He was initially seen in our clinic in  when he reported that although he had history of seizure disorder he stopped taking seizure medication because he was told by someone.  In 2015 he wandered into someone else's house after he was mowing his lawn and had no recollection of how he walked from his home to his neighbor's house.  Paramedics were called he was taken to the emergency room.  Prior to that in  he was admitted to the hospital with possible seizures and although he was started on Keppra for unclear reason he was not taking that medication and it was  "restarted.     PROBLEM LIST   Patient Active Problem List   Diagnosis Code    Epilepsy with partial complex seizures (H) G40.209    Hyperlipidemia E78.5    Tobacco use disorder F17.200         PAST MEDICAL & SURGICAL HISTORY     Past Medical History:   Patient  has no past medical history on file.    Surgical History:  He  has no past surgical history on file.     SOCIAL HISTORY     Reviewed, and he  reports that he has been smoking cigarettes. He has been smoking an average of .25 packs per day. He has never used smokeless tobacco. He reports that he does not currently use alcohol.     FAMILY HISTORY     Reviewed, and family history includes Alzheimer Disease in his mother; Cerebrovascular Disease in his brother, father, and mother; Coronary Artery Disease in his father; Diabetes in his father; Myocardial Infarction in his brother and father; No Known Problems in his brother.     ALLERGIES     No Known Allergies      REVIEW OF SYSTEMS     A 12 point review of system was performed and was negative except as outlined in the history of present illness.     HOME MEDICATIONS     Current Outpatient Rx   Medication Sig Dispense Refill    levETIRAcetam (KEPPRA) 500 MG tablet Take 1 tablet (500 mg) by mouth 2 times daily 180 tablet 3         PHYSICAL EXAM     Vital signs  /81 (BP Location: Right arm, Patient Position: Sitting)   Pulse 70   Ht 1.727 m (5' 8\")   Wt 74.8 kg (165 lb)   BMI 25.09 kg/m      Weight:   165 lbs 0 oz    Patient is alert and oriented x4 in no acute distress. Vital signs were reviewed and are documented in electronic medical record. Neck was supple, no carotid bruits, thyromegaly, JVD, or lymphadenopathy was noted.   NEUROLOGY EXAM:   Patient s speech was normal with no aphasia or dysarthria. Mentation, and affect were also normal.    Funduscopic exam was normal, with normal cup to disc ratio. Cranial nerves II -XII were intact.    Patient had normal mass, tone and motor strength was 5/5 in " all extremities without pronator drift.    Sensation was intact to light touch, pinprick, and vibratory sensation.    Reflexes were 1+ symmetrical with downgoing toes.    No dysmetria noted on FNF or HKS. Romberg was negative.   Gait testing was normal. Able to walk on toes/heels. Tandem walk normal.     PERTINENT DIAGNOSTIC STUDIES     Following studies were reviewed:     MRI BRAIN 10/20/2015   1.  No mass, hemorrhage, or recent infarct. No definite seizure generating focus identified.  2.  Mild inflammatory changes of the paranasal sinuses.  3.  No significant interval change compared to 08/06/2014.    EEG 10/8/2015   This is essentially normal awake EEG.  The recording is contaminated with muscle artifact that makes interpreting this tracing somewhat challenging.     PERTINENT LABS  Following labs were reviewed:  No visits with results within 3 Month(s) from this visit.   Latest known visit with results is:   Lab on 02/25/2022   Component Date Value Ref Range Status    Sodium 02/25/2022 139  136 - 145 mmol/L Final    Potassium 02/25/2022 4.3  3.5 - 5.0 mmol/L Final    Chloride 02/25/2022 106  98 - 107 mmol/L Final    Carbon Dioxide (CO2) 02/25/2022 26  22 - 31 mmol/L Final    Anion Gap 02/25/2022 7  5 - 18 mmol/L Final    Levetiracetam 02/25/2022 20.3  6.0 - 46.0 ug/mL Final         Total time spent for face to face visit, reviewing labs/imaging studies, counseling and coordination of care was: 30 Minutes spent on the date of the encounter doing chart review, review of outside records, review of test results, interpretation of tests, patient visit, and documentation     This note was dictated using voice recognition software.  Any grammatical or context distortions are unintentional and inherent to the software.    Orders Placed This Encounter   Procedures    Keppra (Levetiracetam) Level    Basic metabolic panel      New Prescriptions    No medications on file     Modified Medications    Modified Medication  Previous Medication    LEVETIRACETAM (KEPPRA) 500 MG TABLET levETIRAcetam (KEPPRA) 500 MG tablet       Take 1 tablet (500 mg) by mouth 2 times daily    Take 1 tablet (500 mg) by mouth 2 times daily for 180 days                     Again, thank you for allowing me to participate in the care of your patient.        Sincerely,        Zechariah Mondragon MD

## 2024-08-28 NOTE — PROGRESS NOTES
NEUROLOGY FOLLOW UP VISIT  NOTE       [facility] NEUROLOGY [place]  [address] [place] [address]  Tel: [phone]  Fax: [phone]  [email or web address]     Vitor Vargas,  1950, MRN [id number]  PCP: No Ref-Primary, Physician  Date: 2024      ASSESSMENT & PLAN     Visit Diagnosis  Epilepsy with partial complex seizures (H)     Complex partial seizures  73-year-old male with history of HLD, complex partial seizures returns for follow-up.  His seizures have remained well-controlled on current dose and I have recommended:    1.  Continue Keppra 500 mg twice daily.  Prescriptions were filled for next year  2.  Check Keppra level and basic metabolic panel  3.  Follow-up in 1 year    Thank you again for this referral, please feel free to contact me if you have any questions.    Zechariah Mondragon MD  [facility] NEUROLOGY, [place]     HISTORY OF PRESENT ILLNESS     Patient is a 73-year-old gentleman with history of HLD, complex partial seizures returns for yearly follow-up.  He was last seen on 2023 and was continued on Keppra and level was checked and was therapeutic at 12.6.  During his last visit I had encouraged him to see his primary care physician but he was not interested.  Since his last visit he denies any seizures.  He claims ever since the dose of Keppra was adjusted he has remained seizure-free.    He was initially seen in our clinic in  when he reported that although he had history of seizure disorder he stopped taking seizure medication because he was told by someone.  In 2015 he wandered into someone else's house after he was mowing his lawn and had no recollection of how he walked from his home to his neighbor's house.  Paramedics were called he was taken to the emergency room.  Prior to that in  he was admitted to the hospital with possible seizures and although he was started on Keppra for unclear reason he was not taking that  "medication and it was restarted.     PROBLEM LIST   Patient Active Problem List   Diagnosis    Epilepsy with partial complex seizures (H)    Hyperlipidemia    Tobacco use disorder         PAST MEDICAL & SURGICAL HISTORY     Past Medical History:   Patient  has no past medical history on file.    Surgical History:  He  has no past surgical history on file.     SOCIAL HISTORY     Reviewed, and he  reports that he has quit smoking. His smoking use included cigarettes. He has never used smokeless tobacco. He reports that he does not currently use alcohol.     FAMILY HISTORY     Reviewed, and family history includes Alzheimer Disease in his mother; Cerebrovascular Disease in his brother, father, and mother; Coronary Artery Disease in his father; Diabetes in his father; Myocardial Infarction in his brother and father; No Known Problems in his brother.     ALLERGIES     No Known Allergies      REVIEW OF SYSTEMS     A 12 point review of system was performed and was negative except as outlined in the history of present illness.     HOME MEDICATIONS     Current Outpatient Rx   Medication Sig Dispense Refill    levETIRAcetam (KEPPRA) 500 MG tablet Take 1 tablet (500 mg) by mouth 2 times daily. 180 tablet 3         PHYSICAL EXAM     Vital signs  BP (!) 163/80 (BP Location: Left arm, Patient Position: Sitting)   Pulse 69   Ht 1.727 m (5' 8\")   Wt 68.9 kg (152 lb)   BMI 23.11 kg/m      Weight:   152 lbs 0 oz    Patient is alert and oriented x4 in no acute distress. Vital signs were reviewed and are documented in electronic medical record. Neck was supple, no carotid bruits, thyromegaly, JVD, or lymphadenopathy was noted.   NEUROLOGY EXAM:   Patient s speech was normal with no aphasia or dysarthria. Mentation, and affect were also normal.    Funduscopic exam was normal, with normal cup to disc ratio. Cranial nerves II -XII were intact.    Patient had normal mass, tone and motor strength was 5/5 in all extremities without " pronator drift.    Sensation was intact to light touch, pinprick, and vibratory sensation.    Reflexes were 1+ symmetrical with downgoing toes.    No dysmetria noted on FNF or HKS. Romberg was negative.   Gait testing was normal. Able to walk on toes/heels. Tandem walk normal.     PERTINENT DIAGNOSTIC STUDIES     Following studies were reviewed:     MRI BRAIN 10/20/2015   1.  No mass, hemorrhage, or recent infarct. No definite seizure generating focus identified.  2.  Mild inflammatory changes of the paranasal sinuses.  3.  No significant interval change compared to 08/06/2014.    EEG 10/8/2015   This is essentially normal awake EEG.  The recording is contaminated with muscle artifact that makes interpreting this tracing somewhat challenging.     PERTINENT LABS  Following labs were reviewed:  No visits with results within 3 Month(s) from this visit.   Latest known visit with results is:   Lab on 08/29/2023   Component Date Value Ref Range Status    Keppra (Levetiracetam) Level 08/29/2023 12.6  10.0 - 40.0  g/mL Final    Sodium 08/29/2023 137  136 - 145 mmol/L Final    Potassium 08/29/2023 4.1  3.4 - 5.3 mmol/L Final    Chloride 08/29/2023 103  98 - 107 mmol/L Final    Carbon Dioxide (CO2) 08/29/2023 26  22 - 29 mmol/L Final    Anion Gap 08/29/2023 8  7 - 15 mmol/L Final    Urea Nitrogen 08/29/2023 9.9  8.0 - 23.0 mg/dL Final    Creatinine 08/29/2023 0.88  0.67 - 1.17 mg/dL Final    Calcium 08/29/2023 9.2  8.8 - 10.2 mg/dL Final    Glucose 08/29/2023 79  70 - 99 mg/dL Final    GFR Estimate 08/29/2023 >90  >60 mL/min/1.73m2 Final         Total time spent for face to face visit, reviewing labs/imaging studies, counseling and coordination of care was: 30 Minutes spent on the date of the encounter doing chart review, review of outside records, review of test results, interpretation of tests, patient visit, and documentation     The longitudinal plan of care for the diagnosis(es)/condition(s) as documented were addressed  during this visit. Due to the added complexity in care, I will continue to support Vitor in the subsequent management and with ongoing continuity of care.    This note was dictated using voice recognition software.  Any grammatical or context distortions are unintentional and inherent to the software.    Orders Placed This Encounter   Procedures    Basic metabolic panel    Keppra (Levetiracetam) Level      New Prescriptions    No medications on file     Modified Medications    Modified Medication Previous Medication    LEVETIRACETAM (KEPPRA) 500 MG TABLET levETIRAcetam (KEPPRA) 500 MG tablet       Take 1 tablet (500 mg) by mouth 2 times daily.    Take 1 tablet (500 mg) by mouth 2 times daily

## 2024-08-29 ENCOUNTER — LAB (OUTPATIENT)
Dept: LAB | Facility: HOSPITAL | Age: 74
End: 2024-08-29
Payer: COMMERCIAL

## 2024-08-29 ENCOUNTER — OFFICE VISIT (OUTPATIENT)
Dept: NEUROLOGY | Facility: CLINIC | Age: 74
End: 2024-08-29
Payer: COMMERCIAL

## 2024-08-29 VITALS
HEART RATE: 69 BPM | BODY MASS INDEX: 23.04 KG/M2 | HEIGHT: 68 IN | DIASTOLIC BLOOD PRESSURE: 80 MMHG | WEIGHT: 152 LBS | SYSTOLIC BLOOD PRESSURE: 163 MMHG

## 2024-08-29 DIAGNOSIS — G40.209 EPILEPSY WITH PARTIAL COMPLEX SEIZURES (H): ICD-10-CM

## 2024-08-29 DIAGNOSIS — G40.209 PARTIAL SYMPTOMATIC EPILEPSY WITH COMPLEX PARTIAL SEIZURES, NOT INTRACTABLE, WITHOUT STATUS EPILEPTICUS (H): Primary | ICD-10-CM

## 2024-08-29 DIAGNOSIS — G40.209 PARTIAL SYMPTOMATIC EPILEPSY WITH COMPLEX PARTIAL SEIZURES, NOT INTRACTABLE, WITHOUT STATUS EPILEPTICUS (H): ICD-10-CM

## 2024-08-29 LAB
ANION GAP SERPL CALCULATED.3IONS-SCNC: 8 MMOL/L (ref 7–15)
BUN SERPL-MCNC: 9.3 MG/DL (ref 8–23)
CALCIUM SERPL-MCNC: 9.2 MG/DL (ref 8.8–10.4)
CHLORIDE SERPL-SCNC: 99 MMOL/L (ref 98–107)
CREAT SERPL-MCNC: 0.98 MG/DL (ref 0.67–1.17)
EGFRCR SERPLBLD CKD-EPI 2021: 81 ML/MIN/1.73M2
GLUCOSE SERPL-MCNC: 91 MG/DL (ref 70–99)
HCO3 SERPL-SCNC: 28 MMOL/L (ref 22–29)
LEVETIRACETAM SERPL-MCNC: 12.2 ΜG/ML (ref 10–40)
POTASSIUM SERPL-SCNC: 4.3 MMOL/L (ref 3.4–5.3)
SODIUM SERPL-SCNC: 135 MMOL/L (ref 135–145)

## 2024-08-29 PROCEDURE — 80177 DRUG SCRN QUAN LEVETIRACETAM: CPT

## 2024-08-29 PROCEDURE — 36415 COLL VENOUS BLD VENIPUNCTURE: CPT

## 2024-08-29 PROCEDURE — 82374 ASSAY BLOOD CARBON DIOXIDE: CPT

## 2024-08-29 PROCEDURE — 99214 OFFICE O/P EST MOD 30 MIN: CPT | Performed by: PSYCHIATRY & NEUROLOGY

## 2024-08-29 PROCEDURE — G2211 COMPLEX E/M VISIT ADD ON: HCPCS | Performed by: PSYCHIATRY & NEUROLOGY

## 2024-08-29 RX ORDER — LEVETIRACETAM 500 MG/1
500 TABLET ORAL 2 TIMES DAILY
Qty: 180 TABLET | Refills: 3 | Status: SHIPPED | OUTPATIENT
Start: 2024-08-29

## 2024-08-29 NOTE — NURSING NOTE
Chief Complaint   Patient presents with    Seizures     Annual follow up- no new concerns      Fany Hwang CMA on 8/29/2024 at 12:28 PM  [facility]

## 2024-08-29 NOTE — LETTER
2024      Vitor Vargas  [address] [facility] [address]      Dear Colleague,    Thank you for referring your patient, Vitor Vargas, to the [facility] [place]. Please see a copy of my visit note below.    NEUROLOGY FOLLOW UP VISIT  NOTE       [facility] [place]  [address] [place] [address]  Tel: [phone]  Fax: [phone]  [email or web address]     Vitor Vargas,  1950, MRN [id number]  PCP: No Ref-Primary, Physician  Date: 2024      ASSESSMENT & PLAN     Visit Diagnosis  Epilepsy with partial complex seizures (H)     Complex partial seizures  73-year-old male with history of HLD, complex partial seizures returns for follow-up.  His seizures have remained well-controlled on current dose and I have recommended:    1.  Continue Keppra 500 mg twice daily.  Prescriptions were filled for next year  2.  Check Keppra level and basic metabolic panel  3.  Follow-up in 1 year    Thank you again for this referral, please feel free to contact me if you have any questions.    Zechariah Mondragon MD  [facility], [place]     HISTORY OF PRESENT ILLNESS     Patient is a 73-year-old gentleman with history of HLD, complex partial seizures returns for yearly follow-up.  He was last seen on 2023 and was continued on Keppra and level was checked and was therapeutic at 12.6.  During his last visit I had encouraged him to see his primary care physician but he was not interested.  Since his last visit he denies any seizures.  He claims ever since the dose of Keppra was adjusted he has remained seizure-free.    He was initially seen in our clinic in  when he reported that although he had history of seizure disorder he stopped taking seizure medication because he was told by someone.  In 2015 he wandered into someone else's house after he was mowing his lawn and had no recollection of how he walked from his home to his  "neighbor's house.  Paramedics were called he was taken to the emergency room.  Prior to that in 2014 he was admitted to the hospital with possible seizures and although he was started on Keppra for unclear reason he was not taking that medication and it was restarted.     PROBLEM LIST   Patient Active Problem List   Diagnosis     Epilepsy with partial complex seizures (H)     Hyperlipidemia     Tobacco use disorder         PAST MEDICAL & SURGICAL HISTORY     Past Medical History:   Patient  has no past medical history on file.    Surgical History:  He  has no past surgical history on file.     SOCIAL HISTORY     Reviewed, and he  reports that he has quit smoking. His smoking use included cigarettes. He has never used smokeless tobacco. He reports that he does not currently use alcohol.     FAMILY HISTORY     Reviewed, and family history includes Alzheimer Disease in his mother; Cerebrovascular Disease in his brother, father, and mother; Coronary Artery Disease in his father; Diabetes in his father; Myocardial Infarction in his brother and father; No Known Problems in his brother.     ALLERGIES     No Known Allergies      REVIEW OF SYSTEMS     A 12 point review of system was performed and was negative except as outlined in the history of present illness.     HOME MEDICATIONS     Current Outpatient Rx   Medication Sig Dispense Refill     levETIRAcetam (KEPPRA) 500 MG tablet Take 1 tablet (500 mg) by mouth 2 times daily. 180 tablet 3         PHYSICAL EXAM     Vital signs  BP (!) 163/80 (BP Location: Left arm, Patient Position: Sitting)   Pulse 69   Ht 1.727 m (5' 8\")   Wt 68.9 kg (152 lb)   BMI 23.11 kg/m      Weight:   152 lbs 0 oz    Patient is alert and oriented x4 in no acute distress. Vital signs were reviewed and are documented in electronic medical record. Neck was supple, no carotid bruits, thyromegaly, JVD, or lymphadenopathy was noted.   NEUROLOGY EXAM:    Patient s speech was normal with no aphasia or " dysarthria. Mentation, and affect were also normal.     Funduscopic exam was normal, with normal cup to disc ratio. Cranial nerves II -XII were intact.     Patient had normal mass, tone and motor strength was 5/5 in all extremities without pronator drift.     Sensation was intact to light touch, pinprick, and vibratory sensation.     Reflexes were 1+ symmetrical with downgoing toes.     No dysmetria noted on FNF or HKS. Romberg was negative.    Gait testing was normal. Able to walk on toes/heels. Tandem walk normal.     PERTINENT DIAGNOSTIC STUDIES     Following studies were reviewed:     MRI BRAIN 10/20/2015   1.  No mass, hemorrhage, or recent infarct. No definite seizure generating focus identified.  2.  Mild inflammatory changes of the paranasal sinuses.  3.  No significant interval change compared to 08/06/2014.    EEG 10/8/2015   This is essentially normal awake EEG.  The recording is contaminated with muscle artifact that makes interpreting this tracing somewhat challenging.     PERTINENT LABS  Following labs were reviewed:  No visits with results within 3 Month(s) from this visit.   Latest known visit with results is:   Lab on 08/29/2023   Component Date Value Ref Range Status     Keppra (Levetiracetam) Level 08/29/2023 12.6  10.0 - 40.0  g/mL Final     Sodium 08/29/2023 137  136 - 145 mmol/L Final     Potassium 08/29/2023 4.1  3.4 - 5.3 mmol/L Final     Chloride 08/29/2023 103  98 - 107 mmol/L Final     Carbon Dioxide (CO2) 08/29/2023 26  22 - 29 mmol/L Final     Anion Gap 08/29/2023 8  7 - 15 mmol/L Final     Urea Nitrogen 08/29/2023 9.9  8.0 - 23.0 mg/dL Final     Creatinine 08/29/2023 0.88  0.67 - 1.17 mg/dL Final     Calcium 08/29/2023 9.2  8.8 - 10.2 mg/dL Final     Glucose 08/29/2023 79  70 - 99 mg/dL Final     GFR Estimate 08/29/2023 >90  >60 mL/min/1.73m2 Final         Total time spent for face to face visit, reviewing labs/imaging studies, counseling and coordination of care was: 30 Minutes spent  on the date of the encounter doing chart review, review of outside records, review of test results, interpretation of tests, patient visit, and documentation     The longitudinal plan of care for the diagnosis(es)/condition(s) as documented were addressed during this visit. Due to the added complexity in care, I will continue to support Vitor in the subsequent management and with ongoing continuity of care.    This note was dictated using voice recognition software.  Any grammatical or context distortions are unintentional and inherent to the software.    Orders Placed This Encounter   Procedures     Basic metabolic panel     Keppra (Levetiracetam) Level      New Prescriptions    No medications on file     Modified Medications    Modified Medication Previous Medication    LEVETIRACETAM (KEPPRA) 500 MG TABLET levETIRAcetam (KEPPRA) 500 MG tablet       Take 1 tablet (500 mg) by mouth 2 times daily.    Take 1 tablet (500 mg) by mouth 2 times daily                 Again, thank you for allowing me to participate in the care of your patient.        Sincerely,        Zechariah Mondragon MD

## 2025-08-21 ENCOUNTER — RESULTS FOLLOW-UP (OUTPATIENT)
Dept: NEUROLOGY | Facility: CLINIC | Age: 75
End: 2025-08-21

## 2025-08-21 ENCOUNTER — LAB (OUTPATIENT)
Dept: LAB | Facility: HOSPITAL | Age: 75
End: 2025-08-21
Payer: COMMERCIAL

## 2025-08-21 ENCOUNTER — OFFICE VISIT (OUTPATIENT)
Dept: NEUROLOGY | Facility: CLINIC | Age: 75
End: 2025-08-21
Payer: COMMERCIAL

## 2025-08-21 VITALS
SYSTOLIC BLOOD PRESSURE: 128 MMHG | RESPIRATION RATE: 18 BRPM | BODY MASS INDEX: 23.11 KG/M2 | DIASTOLIC BLOOD PRESSURE: 60 MMHG | HEART RATE: 70 BPM | WEIGHT: 152 LBS

## 2025-08-21 DIAGNOSIS — G40.209 EPILEPSY WITH PARTIAL COMPLEX SEIZURES (H): Primary | ICD-10-CM

## 2025-08-21 DIAGNOSIS — G40.209 EPILEPSY WITH PARTIAL COMPLEX SEIZURES (H): ICD-10-CM

## 2025-08-21 LAB
ANION GAP SERPL CALCULATED.3IONS-SCNC: 11 MMOL/L (ref 7–15)
BUN SERPL-MCNC: 6.7 MG/DL (ref 8–23)
CALCIUM SERPL-MCNC: 9.6 MG/DL (ref 8.8–10.4)
CHLORIDE SERPL-SCNC: 101 MMOL/L (ref 98–107)
CREAT SERPL-MCNC: 0.63 MG/DL (ref 0.67–1.17)
EGFRCR SERPLBLD CKD-EPI 2021: >90 ML/MIN/1.73M2
GLUCOSE SERPL-MCNC: 98 MG/DL (ref 70–99)
HCO3 SERPL-SCNC: 26 MMOL/L (ref 22–29)
LEVETIRACETAM SERPL-MCNC: 16.7 ÂΜG/ML (ref 10–40)
POTASSIUM SERPL-SCNC: 3.6 MMOL/L (ref 3.4–5.3)
SODIUM SERPL-SCNC: 138 MMOL/L (ref 135–145)

## 2025-08-21 PROCEDURE — 36415 COLL VENOUS BLD VENIPUNCTURE: CPT

## 2025-08-21 PROCEDURE — 80177 DRUG SCRN QUAN LEVETIRACETAM: CPT

## 2025-08-21 PROCEDURE — 80048 BASIC METABOLIC PNL TOTAL CA: CPT

## 2025-08-21 RX ORDER — LEVETIRACETAM 500 MG/1
500 TABLET ORAL 2 TIMES DAILY
Qty: 180 TABLET | Refills: 3 | Status: SHIPPED | OUTPATIENT
Start: 2025-08-21